# Patient Record
Sex: MALE | Race: WHITE | URBAN - METROPOLITAN AREA
[De-identification: names, ages, dates, MRNs, and addresses within clinical notes are randomized per-mention and may not be internally consistent; named-entity substitution may affect disease eponyms.]

---

## 2017-03-10 LAB
ALT SERPL-CCNC: 53 U/L (ref 0–65)
AST SERPL-CCNC: 34 U/L (ref 5–40)
CHOLEST SERPL-MCNC: 209 MG/DL
CREAT SERPL-MCNC: 1.3 MG/DL (ref 0.5–1.5)
GFR SERPL CREATININE-BSD FRML MDRD: 54 ML/MIN/1.73M2
GLUCOSE SERPL-MCNC: 100 MG/DL (ref 70–100)
HDLC SERPL-MCNC: 43 MG/DL (ref 40–67)
LDLC SERPL CALC-MCNC: 138 MG/DL
TRIGL SERPL-MCNC: 140 MG/DL

## 2017-03-13 ENCOUNTER — TRANSFERRED RECORDS (OUTPATIENT)
Dept: HEALTH INFORMATION MANAGEMENT | Facility: CLINIC | Age: 72
End: 2017-03-13

## 2017-04-18 ENCOUNTER — OFFICE VISIT (OUTPATIENT)
Dept: OPTOMETRY | Facility: CLINIC | Age: 72
End: 2017-04-18
Payer: COMMERCIAL

## 2017-04-18 DIAGNOSIS — H52.03 HYPEROPIA, BILATERAL: ICD-10-CM

## 2017-04-18 DIAGNOSIS — H26.9 CATARACTS, BOTH EYES: ICD-10-CM

## 2017-04-18 DIAGNOSIS — H02.889 MEIBOMIAN GLAND DYSFUNCTION: ICD-10-CM

## 2017-04-18 DIAGNOSIS — H52.4 PRESBYOPIA: ICD-10-CM

## 2017-04-18 DIAGNOSIS — H04.123 DRY EYE SYNDROME, BILATERAL: ICD-10-CM

## 2017-04-18 DIAGNOSIS — H52.203 ASTIGMATISM, BILATERAL: ICD-10-CM

## 2017-04-18 PROCEDURE — 92015 DETERMINE REFRACTIVE STATE: CPT | Performed by: OPTOMETRIST

## 2017-04-18 PROCEDURE — 92004 COMPRE OPH EXAM NEW PT 1/>: CPT | Performed by: OPTOMETRIST

## 2017-04-18 RX ORDER — HYDROCHLOROTHIAZIDE 12.5 MG/1
12.5 CAPSULE ORAL DAILY
COMMUNITY

## 2017-04-18 ASSESSMENT — VISUAL ACUITY
METHOD: SNELLEN - LINEAR
OD_CC: 20/30
OS_CC+: -2
OS_CC: 20/40
OS_SC: 20/50
OS_CC: 20/30
OD_CC: 20/25
OD_CC+: -2
CORRECTION_TYPE: GLASSES
OS_SC+: -1
OD_SC: 20/100

## 2017-04-18 ASSESSMENT — TONOMETRY
OD_IOP_MMHG: 8
OS_IOP_MMHG: 13
IOP_METHOD: TONOPEN

## 2017-04-18 ASSESSMENT — REFRACTION_MANIFEST
OD_AXIS: 010
OS_CYLINDER: +1.50
OS_SPHERE: +0.75
OD_CYLINDER: +1.00
OS_AXIS: 160
OD_ADD: +2.50
OS_ADD: +2.50
OD_SPHERE: +1.75

## 2017-04-18 ASSESSMENT — REFRACTION_WEARINGRX
OD_CYLINDER: +1.00
OD_ADD: +2.50
OS_SPHERE: +0.25
OD_AXIS: 010
OS_ADD: +2.50
OD_SPHERE: +1.50
OS_AXIS: 157
SPECS_TYPE: PAL
OS_CYLINDER: +1.25

## 2017-04-18 ASSESSMENT — CONF VISUAL FIELD
OS_NORMAL: 1
METHOD: COUNTING FINGERS
OD_NORMAL: 1

## 2017-04-18 ASSESSMENT — CUP TO DISC RATIO
OS_RATIO: 0.2
OD_RATIO: 0.1

## 2017-04-18 ASSESSMENT — SLIT LAMP EXAM - LIDS
COMMENTS: MEIBOMIAN GLAND DYSFUNCTION, UPPER LID DERMATOCHALASIS
COMMENTS: MEIBOMIAN GLAND DYSFUNCTION, UPPER LID DERMATOCHALASIS

## 2017-04-18 ASSESSMENT — EXTERNAL EXAM - LEFT EYE: OS_EXAM: ROSACEA

## 2017-04-18 ASSESSMENT — EXTERNAL EXAM - RIGHT EYE: OD_EXAM: ROSACEA

## 2017-04-18 NOTE — MR AVS SNAPSHOT
After Visit Summary   4/18/2017    Ann-Marie Bautista    MRN: [id number]           Patient Information     Date Of Birth          1945        Visit Information        Provider Department      4/18/2017 12:00 PM Jorge Flores OD [facility]        Today's Diagnoses     Hyperopia, bilateral        Astigmatism, bilateral        Presbyopia        Cataracts, both eyes        Meibomian gland dysfunction        Dry eye syndrome, bilateral          Care Instructions    Recommend new glasses.    You have the start of mild cataracts.  You may notice some blurred vision or glare with night driving.  It is important that you wear good sunglasses to protect your eyes from the ultraviolet light from the sun.  I recommend that you return in 1 year for an eye exam unless there are any sudden changes in your vision.    Systane Balance- 1 drop both eyes 4 x day.    Return in 1 year for a complete eye exam or sooner if needed.    Jorge Flores, LAMBERTO    The affects of the dilating drops last for 4- 6 hours.  You will be more sensitive to light and vision will be blurry up close.  Mydriatic sunglasses were given if needed.      Optometry Providers       Clinic Locations                                 Telephone Number   Dr. Britt Flores   [facility] [place]  [phone]     [facility] Hours:                [facility] [facility] Hours:       [place] [facility] Hours:  [address]   [address]     [address] [place] [address]   [place] [address]    [place] [address]  Phone: [phone]                    Phone [phone]                      Phone: [phone]                          Monday 8:00-7:00                          Monday 8:00-7:00                          Monday 8:00-7:00              Tuesday 8:00-6:00                          Tuesday 8:00-7:00                "           Tuesday 8:00-7:00              Wednesday 8:00-6:00                  Wednesday 8:00-7:00                   Wednesday 8:00-7:00      Thursday 8:00-6:00                        Thursday 8:00-7:00                         Thursday 8:00-7:00            Friday 8:00-5:00                              Friday 8:00-5:00                              Friday 8:00-5:00    Please log on to [email or web address] to order your contact lenses.  The link is found on the [facility] page.  As always, Thank you for trusting us with your health care needs!          Follow-ups after your visit        Follow-up notes from your care team     Return in about 1 year (around 4/18/2018) for Annual Visit.      Who to contact     If you have questions or need follow up information about today's clinic visit or your schedule please contact [facility] directly at [phone].  Normal or non-critical lab and imaging results will be communicated to you by [employer], letter or phone within 4 business days after the clinic has received the results. If you do not hear from us within 7 days, please contact the clinic through [employer] or phone. If you have a critical or abnormal lab result, we will notify you by phone as soon as possible.  Submit refill requests through [employer] or call your pharmacy and they will forward the refill request to us. Please allow 3 business days for your refill to be completed.          Additional Information About Your Visit        [employer] Information     [employer] lets you send messages to your doctor, view your test results, renew your prescriptions, schedule appointments and more. To sign up, go to [email or web address] . Click on \"Log in\" on the left side of the screen, which will take you to the Welcome page. Then click on \"Sign up Now\" on the right side of the page.     You will be asked to enter the access code listed below, as well as some personal information. Please follow the " directions to create your username and password.     Your access code is: [email or web address]  Expires: 2017  1:01 PM     Your access code will  in 90 days. If you need help or a new code, please call your [place] clinic or [phone].        Care EveryWhere ID     This is your Care EveryWhere ID. This could be used by other organizations to access your [place] medical records  [id number]         Blood Pressure from Last 3 Encounters:   No data found for BP    Weight from Last 3 Encounters:   No data found for Wt              We Performed the Following     EYE EXAM (SIMPLE-NONBILLABLE)     REFRACTION        Primary Care Provider    No Pcp Confirmed       No address on file        Thank you!     Thank you for choosing [facility]  for your care. Our goal is always to provide you with excellent care. Hearing back from our patients is one way we can continue to improve our services. Please take a few minutes to complete the written survey that you may receive in the mail after your visit with us. Thank you!             Your Updated Medication List - Protect others around you: Learn how to safely use, store and throw away your medicines at www.disposemymeds.org.          This list is accurate as of: 17  1:01 PM.  Always use your most recent med list.                   Brand Name Dispense Instructions for use    ASPIRIN ADULT LOW STRENGTH PO          hydrochlorothiazide 12.5 MG capsule    MICROZIDE     Take 12.5 mg by mouth daily       METOPROLOL SUCCINATE ER PO          SIMVASTATIN PO

## 2017-04-18 NOTE — PROGRESS NOTES
Chief Complaint   Patient presents with     COMPREHENSIVE EYE EXAM      Accompanied by wife  Last Eye Exam: 1+ year  Dilated Previously: Yes    What are you currently using to see?  glasses       Distance Vision Acuity: Satisfied with vision    Near Vision Acuity: Satisfied with vision while reading  with glasses    Eye Comfort: dry and itchy sometimes  Do you use eye drops? : Yes: systane - morning and night- controls dryness and itching-  Occupation or Hobbies: retired    od eye scratched by rake in 1995    Britney Lynn Optometric Assistant, A.B.O.C.          Medical, surgical and family histories reviewed and updated 4/18/2017.       OBJECTIVE: See Ophthalmology exam    ASSESSMENT:    ICD-10-CM    1. Hyperopia, bilateral H52.03 REFRACTION   2. Astigmatism, bilateral H52.203 REFRACTION   3. Presbyopia H52.4 REFRACTION   4. Cataracts, both eyes H26.9 EYE EXAM (SIMPLE-NONBILLABLE)   5. Meibomian gland dysfunction H02.89 EYE EXAM (SIMPLE-NONBILLABLE)   6. Dry eye syndrome, bilateral H04.123 EYE EXAM (SIMPLE-NONBILLABLE)      PLAN:     Patient Instructions   Recommend new glasses.    You have the start of mild cataracts.  You may notice some blurred vision or glare with night driving.  It is important that you wear good sunglasses to protect your eyes from the ultraviolet light from the sun.  I recommend that you return in 1 year for an eye exam unless there are any sudden changes in your vision.    Systane Balance- 1 drop both eyes 4 x day.    Return in 1 year for a complete eye exam or sooner if needed.    Jorge Flores, OD

## 2017-04-18 NOTE — PATIENT INSTRUCTIONS
Recommend new glasses.    You have the start of mild cataracts.  You may notice some blurred vision or glare with night driving.  It is important that you wear good sunglasses to protect your eyes from the ultraviolet light from the sun.  I recommend that you return in 1 year for an eye exam unless there are any sudden changes in your vision.    Systane Balance- 1 drop both eyes 4 x day.    Return in 1 year for a complete eye exam or sooner if needed.    Jorge Flores, OD    The affects of the dilating drops last for 4- 6 hours.  You will be more sensitive to light and vision will be blurry up close.  Mydriatic sunglasses were given if needed.      Optometry Providers       Clinic Locations                                 Telephone Number   Dr. Britt Flores   [facility]  [phone]     [facility] Hours:                [place] [facility] Hours:       [facility] Hours:  [address]   [address] [facility] [address] [facility] [address]     [address] [place] [address]   [place] [address]    [place] [address]  Phone: [phone]                    Phone [phone]                      Phone: [phone]                          Monday 8:00-7:00                          Monday 8:00-7:00                          Monday 8:00-7:00              Tuesday 8:00-6:00                          Tuesday 8:00-7:00                          Tuesday 8:00-7:00              Wednesday 8:00-6:00                  Wednesday 8:00-7:00                   Wednesday 8:00-7:00      Thursday 8:00-6:00                        Thursday 8:00-7:00                         Thursday 8:00-7:00            Friday 8:00-5:00                              Friday 8:00-5:00                              Friday 8:00-5:00    Please log on to [email or web address] to order your contact lenses.  The link is found on the [facility] page.  As always,  Thank you for trusting us with your health care needs!